# Patient Record
Sex: FEMALE | Race: WHITE | NOT HISPANIC OR LATINO | URBAN - METROPOLITAN AREA
[De-identification: names, ages, dates, MRNs, and addresses within clinical notes are randomized per-mention and may not be internally consistent; named-entity substitution may affect disease eponyms.]

---

## 2018-06-13 ENCOUNTER — OUTPATIENT (OUTPATIENT)
Dept: OUTPATIENT SERVICES | Facility: HOSPITAL | Age: 68
LOS: 1 days | End: 2018-06-13
Payer: MEDICARE

## 2018-06-13 ENCOUNTER — APPOINTMENT (OUTPATIENT)
Dept: ULTRASOUND IMAGING | Facility: CLINIC | Age: 68
End: 2018-06-13
Payer: MEDICARE

## 2018-06-13 DIAGNOSIS — Z00.8 ENCOUNTER FOR OTHER GENERAL EXAMINATION: ICD-10-CM

## 2018-06-13 PROCEDURE — 76770 US EXAM ABDO BACK WALL COMP: CPT

## 2018-06-13 PROCEDURE — 76770 US EXAM ABDO BACK WALL COMP: CPT | Mod: 26

## 2018-07-27 ENCOUNTER — APPOINTMENT (OUTPATIENT)
Dept: SURGERY | Facility: CLINIC | Age: 68
End: 2018-07-27
Payer: MEDICARE

## 2018-07-27 VITALS
RESPIRATION RATE: 16 BRPM | HEIGHT: 60 IN | TEMPERATURE: 97.8 F | WEIGHT: 125 LBS | OXYGEN SATURATION: 97 % | HEART RATE: 114 BPM | SYSTOLIC BLOOD PRESSURE: 175 MMHG | BODY MASS INDEX: 24.54 KG/M2 | DIASTOLIC BLOOD PRESSURE: 99 MMHG

## 2018-07-27 DIAGNOSIS — Z82.49 FAMILY HISTORY OF ISCHEMIC HEART DISEASE AND OTHER DISEASES OF THE CIRCULATORY SYSTEM: ICD-10-CM

## 2018-07-27 DIAGNOSIS — N20.0 CALCULUS OF KIDNEY: ICD-10-CM

## 2018-07-27 DIAGNOSIS — Z98.890 OTHER SPECIFIED POSTPROCEDURAL STATES: ICD-10-CM

## 2018-07-27 DIAGNOSIS — Z78.9 OTHER SPECIFIED HEALTH STATUS: ICD-10-CM

## 2018-07-27 DIAGNOSIS — Z84.1 FAMILY HISTORY OF DISORDERS OF KIDNEY AND URETER: ICD-10-CM

## 2018-07-27 PROCEDURE — 99205 OFFICE O/P NEW HI 60 MIN: CPT

## 2018-07-27 RX ORDER — CHOLECALCIFEROL (VITAMIN D3) 25 MCG
TABLET ORAL
Refills: 0 | Status: ACTIVE | COMMUNITY

## 2018-07-27 RX ORDER — FEXOFENADINE HCL 60 MG
CAPSULE ORAL
Refills: 0 | Status: ACTIVE | COMMUNITY

## 2018-07-27 RX ORDER — FENOFIBRATE 48 MG/1
48 TABLET ORAL
Qty: 90 | Refills: 0 | Status: ACTIVE | COMMUNITY
Start: 2018-03-29

## 2018-07-27 RX ORDER — CETIRIZINE HYDROCHLORIDE 10 MG/1
CAPSULE, LIQUID FILLED ORAL
Refills: 0 | Status: ACTIVE | COMMUNITY

## 2018-07-27 RX ORDER — PNV NO.95/FERROUS FUM/FOLIC AC 28MG-0.8MG
TABLET ORAL
Refills: 0 | Status: ACTIVE | COMMUNITY

## 2018-07-27 RX ORDER — ATORVASTATIN CALCIUM 20 MG/1
20 TABLET, FILM COATED ORAL
Qty: 90 | Refills: 0 | Status: ACTIVE | COMMUNITY
Start: 2018-06-06

## 2018-07-27 RX ORDER — TRIAMCINOLONE ACETONIDE 55 UG/1
55 SPRAY, METERED NASAL
Refills: 0 | Status: ACTIVE | COMMUNITY

## 2018-07-27 RX ORDER — CALCIUM CITRATE/VITAMIN D3 315MG-6.25
TABLET ORAL
Refills: 0 | Status: ACTIVE | COMMUNITY

## 2018-08-10 ENCOUNTER — TRANSCRIPTION ENCOUNTER (OUTPATIENT)
Age: 68
End: 2018-08-10

## 2018-08-30 ENCOUNTER — APPOINTMENT (OUTPATIENT)
Dept: SURGERY | Facility: AMBULATORY MEDICAL SERVICES | Age: 68
End: 2018-08-30
Payer: MEDICARE

## 2018-08-30 PROCEDURE — 47562 LAPAROSCOPIC CHOLECYSTECTOMY: CPT

## 2018-09-12 ENCOUNTER — APPOINTMENT (OUTPATIENT)
Dept: SURGERY | Facility: CLINIC | Age: 68
End: 2018-09-12
Payer: MEDICARE

## 2018-09-12 VITALS
SYSTOLIC BLOOD PRESSURE: 152 MMHG | RESPIRATION RATE: 16 BRPM | OXYGEN SATURATION: 98 % | TEMPERATURE: 98.3 F | HEART RATE: 105 BPM | DIASTOLIC BLOOD PRESSURE: 79 MMHG

## 2018-09-12 DIAGNOSIS — Z09 ENCOUNTER FOR FOLLOW-UP EXAMINATION AFTER COMPLETED TREATMENT FOR CONDITIONS OTHER THAN MALIGNANT NEOPLASM: ICD-10-CM

## 2018-09-12 DIAGNOSIS — K82.8 OTHER SPECIFIED DISEASES OF GALLBLADDER: ICD-10-CM

## 2018-09-12 PROCEDURE — 99024 POSTOP FOLLOW-UP VISIT: CPT

## 2019-08-06 ENCOUNTER — TRANSCRIPTION ENCOUNTER (OUTPATIENT)
Age: 69
End: 2019-08-06

## 2020-03-16 ENCOUNTER — RESULT REVIEW (OUTPATIENT)
Age: 70
End: 2020-03-16

## 2021-12-20 ENCOUNTER — APPOINTMENT (OUTPATIENT)
Dept: MAMMOGRAPHY | Facility: CLINIC | Age: 71
End: 2021-12-20
Payer: MEDICARE

## 2021-12-20 PROCEDURE — 77067 SCR MAMMO BI INCL CAD: CPT

## 2021-12-20 PROCEDURE — 77063 BREAST TOMOSYNTHESIS BI: CPT

## 2022-08-18 ENCOUNTER — NON-APPOINTMENT (OUTPATIENT)
Age: 72
End: 2022-08-18

## 2022-12-21 ENCOUNTER — APPOINTMENT (OUTPATIENT)
Dept: RADIOLOGY | Facility: CLINIC | Age: 72
End: 2022-12-21

## 2022-12-21 ENCOUNTER — APPOINTMENT (OUTPATIENT)
Dept: MAMMOGRAPHY | Facility: CLINIC | Age: 72
End: 2022-12-21

## 2022-12-21 PROCEDURE — 77067 SCR MAMMO BI INCL CAD: CPT

## 2022-12-21 PROCEDURE — 77063 BREAST TOMOSYNTHESIS BI: CPT

## 2023-07-11 ENCOUNTER — NON-APPOINTMENT (OUTPATIENT)
Age: 73
End: 2023-07-11

## 2023-09-19 ENCOUNTER — APPOINTMENT (OUTPATIENT)
Dept: ULTRASOUND IMAGING | Facility: CLINIC | Age: 73
End: 2023-09-19
Payer: MEDICARE

## 2023-09-19 PROCEDURE — 76700 US EXAM ABDOM COMPLETE: CPT

## 2023-09-19 PROCEDURE — 76981 USE PARENCHYMA: CPT | Mod: XU

## 2024-01-02 ENCOUNTER — APPOINTMENT (OUTPATIENT)
Dept: MAMMOGRAPHY | Facility: CLINIC | Age: 74
End: 2024-01-02
Payer: MEDICARE

## 2024-01-02 PROCEDURE — 77063 BREAST TOMOSYNTHESIS BI: CPT

## 2024-01-02 PROCEDURE — 77067 SCR MAMMO BI INCL CAD: CPT

## 2024-09-15 ENCOUNTER — EMERGENCY (EMERGENCY)
Facility: HOSPITAL | Age: 74
LOS: 1 days | Discharge: HOME | End: 2024-09-15
Attending: EMERGENCY MEDICINE | Admitting: EMERGENCY MEDICINE
Payer: MEDICARE

## 2024-09-15 VITALS
SYSTOLIC BLOOD PRESSURE: 118 MMHG | WEIGHT: 111.99 LBS | HEART RATE: 87 BPM | RESPIRATION RATE: 18 BRPM | TEMPERATURE: 98 F | DIASTOLIC BLOOD PRESSURE: 71 MMHG | OXYGEN SATURATION: 97 % | HEIGHT: 60 IN

## 2024-09-15 VITALS
SYSTOLIC BLOOD PRESSURE: 151 MMHG | DIASTOLIC BLOOD PRESSURE: 69 MMHG | RESPIRATION RATE: 16 BRPM | OXYGEN SATURATION: 96 % | HEART RATE: 85 BPM | TEMPERATURE: 98 F

## 2024-09-15 PROCEDURE — 70450 CT HEAD/BRAIN W/O DYE: CPT | Mod: 26,MC

## 2024-09-15 PROCEDURE — 99285 EMERGENCY DEPT VISIT HI MDM: CPT

## 2024-09-15 PROCEDURE — 70486 CT MAXILLOFACIAL W/O DYE: CPT | Mod: 26,MC

## 2024-09-15 PROCEDURE — 73030 X-RAY EXAM OF SHOULDER: CPT | Mod: 26,RT

## 2024-09-15 PROCEDURE — 73060 X-RAY EXAM OF HUMERUS: CPT | Mod: 26,RT

## 2024-09-15 PROCEDURE — 72125 CT NECK SPINE W/O DYE: CPT | Mod: 26,MC

## 2024-09-15 RX ORDER — POLYETHYLENE GLYCOL 3350 17 G/17G
17 POWDER, FOR SOLUTION ORAL
Qty: 1 | Refills: 0
Start: 2024-09-15

## 2024-09-15 RX ORDER — OXYCODONE AND ACETAMINOPHEN 7.5; 325 MG/1; MG/1
1 TABLET ORAL
Qty: 8 | Refills: 0
Start: 2024-09-15 | End: 2024-09-16

## 2024-09-15 RX ORDER — ACETAMINOPHEN 325 MG/1
650 TABLET ORAL ONCE
Refills: 0 | Status: COMPLETED | OUTPATIENT
Start: 2024-09-15 | End: 2024-09-15

## 2024-09-15 RX ADMIN — ACETAMINOPHEN 650 MILLIGRAM(S): 325 TABLET ORAL at 16:14

## 2024-09-15 RX ADMIN — ACETAMINOPHEN 650 MILLIGRAM(S): 325 TABLET ORAL at 19:56

## 2024-09-15 NOTE — ED PROVIDER NOTE - NSFOLLOWUPINSTRUCTIONS_ED_ALL_ED_FT
Proximal Humerus Fracture    WHAT YOU NEED TO KNOW:    What is a proximal humerus fracture? A proximal humerus fracture is a crack or break in the top of your upper arm bone. The proximal humerus is one of the bones in your shoulder joint.  Proximal Humerus Fracture    What are the signs and symptoms of a proximal humerus fracture?    Pain when you move your arm    Swelling and bruising    Trouble moving your shoulder    Abnormal arm position or shape    Weakness or numbness in your arm, hand, or fingers  How is a proximal humerus fracture diagnosed? Your healthcare provider will ask about your injury and examine you. An x-ray may show the type of fracture you have. You may need more than 1 x-ray, or another x-ray after several days have passed.    How is an arm fracture treated? Treatment depends on the type of fracture you have. You may need any of the following:    A sling may be needed to hold your broken bones in place. It will decrease your arm movement and allow the bones to heal.  Shoulder Sling      Prescription pain medicine may be given. Ask your healthcare provider how to take this medicine safely. Some prescription pain medicines contain acetaminophen. Do not take other medicines that contain acetaminophen without talking to your healthcare provider. Too much acetaminophen may cause liver damage. Prescription pain medicine may cause constipation. Ask your healthcare provider how to prevent or treat constipation.    NSAIDs, such as ibuprofen, help decrease swelling, pain, and fever. This medicine is available with or without a doctor's order. NSAIDs can cause stomach bleeding or kidney problems in certain people. If you take blood thinner medicine, always ask your healthcare provider if NSAIDs are safe for you. Always read the medicine label and follow directions.    Acetaminophen decreases pain and fever. It is available without a doctor's order. Ask how much to take and how often to take it. Follow directions. Read the labels of all other medicines you are using to see if they also contain acetaminophen, or ask your doctor or pharmacist. Acetaminophen can cause liver damage if not taken correctly.    Closed reduction may be done to put your bones back into the correct position without surgery.    Surgery may be needed to put your bones back into the correct position. This is called open reduction. An incision is made and the bones are put back in the correct position. Wires, pins, plates, or screws may be used to hold the bones in place. For severe fractures, surgery to replace part of the shoulder joint with an artificial implant may be needed.  How can I manage my symptoms?    Rest your arm as much as possible. Ask your healthcare provider when you can move your arm. Also ask when you can return to sports or vigorous exercises.    Apply ice on your arm for 15 to 20 minutes every hour or as directed. Use an ice pack, or put crushed ice in a plastic bag. Cover it with a towel before you put it on your arm. Ice helps prevent tissue damage and decreases swelling and pain.    Go to physical therapy as directed. A physical therapist teaches you exercises to help improve movement and strength, and to decrease pain.  When should I seek immediate care?    Your pain does not get better or gets worse, even after you rest and take medicine.    Your arm, hand, or fingers feel numb.    The skin over your fracture is swollen, cold, or pale.    You cannot move your arm, hand, or fingers.  When should I call my doctor?    You have a fever.    Your sling gets wet, damaged, or falls off.    You have questions or concerns about your condition or care.  CARE AGREEMENT:    You have the right to help plan your care. Learn about your health condition and how it may be treated. Discuss treatment options with your healthcare providers to decide what care you want to receive. You always have the right to refuse treatment.

## 2024-09-15 NOTE — ED PROVIDER NOTE - PATIENT PORTAL LINK FT
You can access the FollowMyHealth Patient Portal offered by [facility] by registering at the following website: [email or web address]. By joining [facility]’s FollowMyHealth portal, you will also be able to view your health information using other applications (apps) compatible with our system.

## 2024-09-15 NOTE — ED PROVIDER NOTE - WR ORDER STATUS 2
Performed Topical Clindamycin Counseling: Patient counseled that this medication may cause skin irritation or allergic reactions.  In the event of skin irritation, the patient was advised to reduce the amount of the drug applied or use it less frequently.   The patient verbalized understanding of the proper use and possible adverse effects of clindamycin.  All of the patient's questions and concerns were addressed.

## 2024-09-15 NOTE — ED ADULT TRIAGE NOTE - CHIEF COMPLAINT QUOTE
Pt BIBA c/o R shoulder pain and deformity s/p trip and fall. Pt denies hitting head/LOC. EMS applied a splint. R wrist pulse/motor/sensory intact.

## 2024-09-15 NOTE — ED ADULT NURSE NOTE - OBJECTIVE STATEMENT
Pt presents to ED A&Ox4 with c/o right shoulder pain s/p single mechanical falls. Pt reports falling from standing height after a trip and fall onto wall and then ground. Pt states hitting nose on wall and then falling onto right shoulder. Denies LOC. PMH vertigo and pt reports currently having episode for past several days. Pt maintains motor and sensory function to RUE. Skin is intact and color WNL.

## 2024-09-15 NOTE — ED PROVIDER NOTE - OBJECTIVE STATEMENT
74-year-old female with hypertension and hyperlipidemia presents with right upper arm injury and mild facial injury sustained just prior to arrival in ED.  Patient [place] the [place] and [place]pped over something on the ground, fell to the ground, struck her face on the ground, did not lose consciousness.  Patient believes she fell onto outstretched right arm.  No vomiting after injury.  Not on blood thinners.  Has not attempted to walk since injury.

## 2024-09-15 NOTE — ED PROVIDER NOTE - CARE PROVIDER_API CALL
Jase Barker  Orthopaedic Surgery  [address]  [place] [address]  Phone: [phone]  Fax: [phone]  Follow Up Time:

## 2024-09-15 NOTE — ED PROVIDER NOTE - CLINICAL SUMMARY MEDICAL DECISION MAKING FREE TEXT BOX
74-year-old patient with history of hypertension and hyperlipidemia presents with right arm pain after mechanical trip and fall with head strike, no LOC, not on blood thinners.  On exam, patient is afebrile, vital signs are stable.  Patient has small contusion over nasal bridge without tenderness to palpation.  Patient has swelling and significant tenderness to palpation of right upper arm.  Presentation concerning for proximal humerus fracture plan for CT head, facial bones, C-spine, x-ray right shoulder and humerus, analgesia, reassess.

## 2024-09-15 NOTE — ED ADULT NURSE NOTE - NS ED NURSE DC INFO COMPLEXITY
The following test results given to patient per Dr. Covarrubias Six:    Stress echo: 4/18, Normal study, Exe: 7:00  2 pt identifiers used
Simple: Patient demonstrates quick and easy understanding/Verbalized Understanding

## 2024-09-15 NOTE — ED ADULT NURSE NOTE - NS ED NURSE DISCH DISPOSITION
normal sinus rhythm, Normal axis, Normal SC interval and QRS complex. There are no acute ischemic ST or T-wave changes.
Discharged

## 2024-09-15 NOTE — ED PROVIDER NOTE - PHYSICAL EXAMINATION
Constitutional: awake and alert, in no acute distress  HEENT: head normocephalic.  Minimal contusion to nasal bridge, no tenderness to palpation,  moist mucous membranes  Eyes: extraocular movements intact, normal conjunctiva  Neck: supple, normal ROM  Cardiovascular: regular rate   Pulmonary: no respiratory distress  Gastrointestinal: abdomen flat and nondistended  Skin: warm, dry, normal for ethnicity  Musculoskeletal: Right upper arm tenderness to palpation with swelling.  Decreased range of motion of shoulder secondary to pain.  Radial pulse 2+ and equal in bilateral upper extremities.  Sensation intact throughout right upper extremity.  Neurological: oriented x4, no focal neurologic deficit.   Psychiatric: calm and cooperative

## 2024-09-19 DIAGNOSIS — S00.33XA CONTUSION OF NOSE, INITIAL ENCOUNTER: ICD-10-CM

## 2024-09-19 DIAGNOSIS — S42.201A UNSPECIFIED FRACTURE OF UPPER END OF RIGHT HUMERUS, INITIAL ENCOUNTER FOR CLOSED FRACTURE: ICD-10-CM
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
## 2024-09-19 DIAGNOSIS — M79.601 PAIN IN RIGHT ARM: ICD-10-CM

## 2024-09-19 DIAGNOSIS — I10 ESSENTIAL (PRIMARY) HYPERTENSION: ICD-10-CM
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
## 2024-09-19 DIAGNOSIS — E78.5 HYPERLIPIDEMIA, UNSPECIFIED: ICD-10-CM

## 2025-01-07 ENCOUNTER — APPOINTMENT (OUTPATIENT)
Dept: MAMMOGRAPHY | Facility: CLINIC | Age: 75
End: 2025-01-07
Payer: MEDICARE

## 2025-01-07 PROCEDURE — 77067 SCR MAMMO BI INCL CAD: CPT

## 2025-01-07 PROCEDURE — 77063 BREAST TOMOSYNTHESIS BI: CPT

## 2025-01-13 ENCOUNTER — APPOINTMENT (OUTPATIENT)
Dept: MAMMOGRAPHY | Facility: IMAGING CENTER | Age: 75
End: 2025-01-13
Payer: MEDICARE

## 2025-01-13 ENCOUNTER — OUTPATIENT (OUTPATIENT)
Dept: OUTPATIENT SERVICES | Facility: HOSPITAL | Age: 75
LOS: 1 days | End: 2025-01-13
Payer: MEDICARE

## 2025-01-13 ENCOUNTER — APPOINTMENT (OUTPATIENT)
Dept: ULTRASOUND IMAGING | Facility: IMAGING CENTER | Age: 75
End: 2025-01-13
Payer: MEDICARE

## 2025-01-13 DIAGNOSIS — Z00.8 ENCOUNTER FOR OTHER GENERAL EXAMINATION: ICD-10-CM

## 2025-01-13 PROCEDURE — G0279: CPT

## 2025-01-13 PROCEDURE — 76642 ULTRASOUND BREAST LIMITED: CPT | Mod: 26,RT

## 2025-01-13 PROCEDURE — 76642 ULTRASOUND BREAST LIMITED: CPT

## 2025-01-13 PROCEDURE — G0279: CPT | Mod: 26

## 2025-01-13 PROCEDURE — 77065 DX MAMMO INCL CAD UNI: CPT | Mod: 26,RT

## 2025-01-13 PROCEDURE — 77065 DX MAMMO INCL CAD UNI: CPT

## 2025-01-24 ENCOUNTER — APPOINTMENT (OUTPATIENT)
Dept: ULTRASOUND IMAGING | Facility: IMAGING CENTER | Age: 75
End: 2025-01-24
Payer: MEDICARE

## 2025-01-24 ENCOUNTER — OUTPATIENT (OUTPATIENT)
Dept: OUTPATIENT SERVICES | Facility: HOSPITAL | Age: 75
LOS: 1 days | End: 2025-01-24
Payer: MEDICARE

## 2025-01-24 DIAGNOSIS — R92.8 OTHER ABNORMAL AND INCONCLUSIVE FINDINGS ON DIAGNOSTIC IMAGING OF BREAST: ICD-10-CM

## 2025-01-24 PROCEDURE — 88305 TISSUE EXAM BY PATHOLOGIST: CPT | Mod: 26

## 2025-01-24 PROCEDURE — 88360 TUMOR IMMUNOHISTOCHEM/MANUAL: CPT

## 2025-01-24 PROCEDURE — 19083 BX BREAST 1ST LESION US IMAG: CPT | Mod: RT

## 2025-01-24 PROCEDURE — 77065 DX MAMMO INCL CAD UNI: CPT | Mod: 26,RT

## 2025-01-24 PROCEDURE — 88360 TUMOR IMMUNOHISTOCHEM/MANUAL: CPT | Mod: 26

## 2025-01-24 PROCEDURE — 77065 DX MAMMO INCL CAD UNI: CPT

## 2025-01-24 PROCEDURE — 19083 BX BREAST 1ST LESION US IMAG: CPT

## 2025-01-24 PROCEDURE — 88305 TISSUE EXAM BY PATHOLOGIST: CPT

## 2025-01-24 PROCEDURE — 88377 M/PHMTRC ALYS ISHQUANT/SEMIQ: CPT

## 2025-01-24 PROCEDURE — 88377 M/PHMTRC ALYS ISHQUANT/SEMIQ: CPT | Mod: 26

## 2025-01-28 LAB — SURGICAL PATHOLOGY STUDY: SIGNIFICANT CHANGE UP

## 2025-01-31 ENCOUNTER — NON-APPOINTMENT (OUTPATIENT)
Age: 75
End: 2025-01-31

## 2025-02-03 ENCOUNTER — NON-APPOINTMENT (OUTPATIENT)
Age: 75
End: 2025-02-03

## 2025-02-04 ENCOUNTER — NON-APPOINTMENT (OUTPATIENT)
Age: 75
End: 2025-02-04